# Patient Record
Sex: FEMALE | Race: WHITE | NOT HISPANIC OR LATINO | URBAN - METROPOLITAN AREA
[De-identification: names, ages, dates, MRNs, and addresses within clinical notes are randomized per-mention and may not be internally consistent; named-entity substitution may affect disease eponyms.]

---

## 2022-05-17 ENCOUNTER — OFFICE (OUTPATIENT)
Dept: URBAN - METROPOLITAN AREA CLINIC 8 | Facility: CLINIC | Age: 52
End: 2022-05-17

## 2022-05-17 VITALS
DIASTOLIC BLOOD PRESSURE: 88 MMHG | HEIGHT: 61 IN | OXYGEN SATURATION: 98 % | WEIGHT: 183 LBS | HEART RATE: 77 BPM | SYSTOLIC BLOOD PRESSURE: 148 MMHG

## 2022-05-17 DIAGNOSIS — K21.9 GASTRO-ESOPHAGEAL REFLUX DISEASE WITHOUT ESOPHAGITIS: ICD-10-CM

## 2022-05-17 DIAGNOSIS — R09.89 OTHER SPECIFIED SYMPTOMS AND SIGNS INVOLVING THE CIRCULATORY: ICD-10-CM

## 2022-05-17 DIAGNOSIS — A04.8 OTHER SPECIFIED BACTERIAL INTESTINAL INFECTIONS: ICD-10-CM

## 2022-05-17 DIAGNOSIS — R10.13 EPIGASTRIC PAIN: ICD-10-CM

## 2022-05-17 PROCEDURE — 99204 OFFICE O/P NEW MOD 45 MIN: CPT | Performed by: NURSE PRACTITIONER

## 2022-05-17 RX ORDER — OMEPRAZOLE 40 MG/1
40 CAPSULE, DELAYED RELEASE ORAL
Qty: 30 | Refills: 11 | Status: ACTIVE
Start: 2022-05-17

## 2022-05-17 NOTE — SERVICEHPINOTES
Ms. Sandoval  is a pleasant 52-year-old  female with a PMH significant for esophagitis / GERD, essential hypertension, hypercholesterolemia, thyroid disease, SVT.  Patient presents for PCP office due to globus sensation pointing to the top of her esophagus without dysphagia.  Mild odynophagia   Present, however.  She denies getting any large  tablets stuck in her esophagus, which would be consistent with pill esophagitis.  She states she swallows liquids, solids, pills just fine.  Has sensation top of her esophagus.   Patient does experience significant heartburn and reflux despite taking omeprazole 40 mg p.o. daily.  Recently her PCP told her to double up and take p.o. b.i.d. x2 weeks.  Patient just completed doing so.  She is still having daily heartburn and reflux with any p.o. intake.  No nausea or vomiting.  Mild epigastric pain at times.  No frequent NSAIDs.  PCP treated the patient for H pylori April of 2022 with amoxicillin, clarithromycin, omeprazole times 14 days.  Patient had subsequent H pylori serology which was negative.  She has never underwent EGD or colonoscopy for any reason the past.  No primary family history of colon cancer, stomach cancer, other GI issues.  States she has regular bowel movements no melena or hematochezia.
br
mina   We discussed screening colonoscopy at time of her EGD, but she wants to wait due to her symptoms.[ROS Wording]